# Patient Record
Sex: MALE | Race: WHITE | NOT HISPANIC OR LATINO | URBAN - NONMETROPOLITAN AREA
[De-identification: names, ages, dates, MRNs, and addresses within clinical notes are randomized per-mention and may not be internally consistent; named-entity substitution may affect disease eponyms.]

---

## 2017-02-02 ENCOUNTER — APPOINTMENT (OUTPATIENT)
Age: 62
Setting detail: DERMATOLOGY
End: 2017-02-02

## 2017-02-02 DIAGNOSIS — Z85.828 PERSONAL HISTORY OF OTHER MALIGNANT NEOPLASM OF SKIN: ICD-10-CM

## 2017-02-02 DIAGNOSIS — L23.7 ALLERGIC CONTACT DERMATITIS DUE TO PLANTS, EXCEPT FOOD: ICD-10-CM

## 2017-02-02 DIAGNOSIS — L57.0 ACTINIC KERATOSIS: ICD-10-CM

## 2017-02-02 PROCEDURE — OTHER TREATMENT REGIMEN: OTHER

## 2017-02-02 PROCEDURE — OTHER COUNSELING: OTHER

## 2017-02-02 PROCEDURE — OTHER PATIENT SPECIFIC COUNSELING: OTHER

## 2017-02-02 PROCEDURE — 99213 OFFICE O/P EST LOW 20 MIN: CPT

## 2017-02-02 ASSESSMENT — LOCATION DETAILED DESCRIPTION DERM
LOCATION DETAILED: LEFT CENTRAL MALAR CHEEK
LOCATION DETAILED: RIGHT MEDIAL TRAPEZIAL NECK

## 2017-02-02 ASSESSMENT — LOCATION ZONE DERM
LOCATION ZONE: FACE
LOCATION ZONE: NECK

## 2017-02-02 ASSESSMENT — LOCATION SIMPLE DESCRIPTION DERM
LOCATION SIMPLE: LEFT CHEEK
LOCATION SIMPLE: POSTERIOR NECK

## 2017-02-02 NOTE — HPI: SKIN LESION
How Severe Is Your Skin Lesion?: mild
Is This A New Presentation, Or A Follow-Up?: Skin Lesion
Additional History: Had been like a scab but feels like it is gone now.

## 2017-02-02 NOTE — HPI: NON-MELANOMA SKIN CANCER F/U (HISTORY OF NMSC)
What Is The Reason For Today's Visit?: History of Non-Melanoma Skin Cancer
How Many Skin Cancers Have You Had?: one
When Was Your Last Cancer Diagnosed?: April 2016

## 2017-02-02 NOTE — PROCEDURE: TREATMENT REGIMEN
Continue Regimen: Clobetasol cream twice daily to affected areas as needed
Detail Level: Simple
Otc Regimen: Lynette X appears to have replaced Lynette Block use as preventative barrier

## 2017-10-16 ENCOUNTER — APPOINTMENT (OUTPATIENT)
Age: 62
Setting detail: DERMATOLOGY
End: 2017-10-16

## 2017-10-16 DIAGNOSIS — L57.0 ACTINIC KERATOSIS: ICD-10-CM

## 2017-10-16 DIAGNOSIS — Z85.828 PERSONAL HISTORY OF OTHER MALIGNANT NEOPLASM OF SKIN: ICD-10-CM

## 2017-10-16 DIAGNOSIS — L28.1 PRURIGO NODULARIS: ICD-10-CM

## 2017-10-16 PROCEDURE — 17003 DESTRUCT PREMALG LES 2-14: CPT

## 2017-10-16 PROCEDURE — OTHER DEFER: OTHER

## 2017-10-16 PROCEDURE — 17000 DESTRUCT PREMALG LESION: CPT

## 2017-10-16 PROCEDURE — OTHER LIQUID NITROGEN: OTHER

## 2017-10-16 PROCEDURE — OTHER COUNSELING: OTHER

## 2017-10-16 PROCEDURE — OTHER PHOTODYNAMIC THERAPY COUNSELING: OTHER

## 2017-10-16 PROCEDURE — OTHER OTHER: OTHER

## 2017-10-16 PROCEDURE — 99213 OFFICE O/P EST LOW 20 MIN: CPT | Mod: 25

## 2017-10-16 PROCEDURE — OTHER EDUCATIONAL RESOURCES PROVIDED: OTHER

## 2017-10-16 ASSESSMENT — LOCATION ZONE DERM
LOCATION ZONE: ARM
LOCATION ZONE: NOSE
LOCATION ZONE: EAR
LOCATION ZONE: FACE
LOCATION ZONE: HAND

## 2017-10-16 ASSESSMENT — LOCATION DETAILED DESCRIPTION DERM
LOCATION DETAILED: LEFT LATERAL MALAR CHEEK
LOCATION DETAILED: LEFT FOREHEAD
LOCATION DETAILED: RIGHT PROXIMAL DORSAL FOREARM
LOCATION DETAILED: RIGHT INFERIOR TEMPLE
LOCATION DETAILED: LEFT RADIAL DORSAL HAND
LOCATION DETAILED: RIGHT SUPERIOR LATERAL BUCCAL CHEEK
LOCATION DETAILED: RIGHT FOREHEAD
LOCATION DETAILED: RIGHT SUPERIOR CRUS OF ANTIHELIX
LOCATION DETAILED: RIGHT LATERAL ELBOW
LOCATION DETAILED: SUPERIOR MID FOREHEAD
LOCATION DETAILED: RIGHT LATERAL MALAR CHEEK
LOCATION DETAILED: NASAL SUPRATIP
LOCATION DETAILED: LEFT SUPERIOR CRUS OF ANTIHELIX
LOCATION DETAILED: RIGHT DISTAL POSTERIOR UPPER ARM
LOCATION DETAILED: LEFT SUPERIOR LATERAL BUCCAL CHEEK
LOCATION DETAILED: LEFT DISTAL DORSAL FOREARM
LOCATION DETAILED: LEFT INFERIOR TEMPLE
LOCATION DETAILED: LEFT CENTRAL MALAR CHEEK
LOCATION DETAILED: RIGHT CENTRAL MALAR CHEEK

## 2017-10-16 ASSESSMENT — LOCATION SIMPLE DESCRIPTION DERM
LOCATION SIMPLE: SUPERIOR FOREHEAD
LOCATION SIMPLE: LEFT EAR
LOCATION SIMPLE: LEFT FOREARM
LOCATION SIMPLE: RIGHT EAR
LOCATION SIMPLE: RIGHT FOREHEAD
LOCATION SIMPLE: LEFT TEMPLE
LOCATION SIMPLE: LEFT HAND
LOCATION SIMPLE: RIGHT UPPER ARM
LOCATION SIMPLE: LEFT FOREHEAD
LOCATION SIMPLE: RIGHT FOREARM
LOCATION SIMPLE: LEFT CHEEK
LOCATION SIMPLE: RIGHT TEMPLE
LOCATION SIMPLE: RIGHT ELBOW
LOCATION SIMPLE: NOSE
LOCATION SIMPLE: RIGHT CHEEK

## 2017-10-16 NOTE — PROCEDURE: LIQUID NITROGEN
Post-Care Instructions: I reviewed with the patient in detail post-care instructions. Patient is to wear sunprotection. Contact Office If actinic keratoses fail to resolve despite treatment, or if you develop a side effect from therapy, such as unbearable crusting, scabbing, redness and tenderness. Return if you notice new similar scaling papules.
Consent: The patient's consent was obtained including but not limited to risks of crusting, scabbing, blistering, scarring, darker or lighter pigmentary change, recurrence, incomplete removal and infection.
Detail Level: Detailed
Total Number Of Aks Treated: 2
Duration Of Freeze Thaw-Cycle (Seconds): 0
Render Post-Care Instructions In Note?: no

## 2017-10-16 NOTE — PROCEDURE: OTHER
Other (Free Text): We discussed the lesions are more consistent with sun damage than poison ivy.
Detail Level: Detailed
Note Text (......Xxx Chief Complaint.): This diagnosis correlates with the
Other (Free Text): N2 freeze

## 2017-12-04 ENCOUNTER — APPOINTMENT (OUTPATIENT)
Age: 62
Setting detail: DERMATOLOGY
End: 2017-12-04

## 2017-12-04 DIAGNOSIS — L57.0 ACTINIC KERATOSIS: ICD-10-CM

## 2017-12-04 PROCEDURE — 96567 PDT DSTR PRMLG LES SKN: CPT

## 2017-12-04 PROCEDURE — OTHER PHOTODYNAMIC THERAPY COUNSELING: OTHER

## 2017-12-04 PROCEDURE — OTHER PHOTO-DOCUMENTATION: OTHER

## 2017-12-04 PROCEDURE — OTHER PDT: BLUE: OTHER

## 2017-12-04 PROCEDURE — OTHER EDUCATIONAL RESOURCES PROVIDED: OTHER

## 2017-12-04 PROCEDURE — OTHER OTHER: OTHER

## 2017-12-04 PROCEDURE — OTHER TREATMENT REGIMEN: OTHER

## 2017-12-04 ASSESSMENT — LOCATION DETAILED DESCRIPTION DERM
LOCATION DETAILED: LEFT CENTRAL MALAR CHEEK
LOCATION DETAILED: SUPERIOR MID FOREHEAD
LOCATION DETAILED: RIGHT CENTRAL MALAR CHEEK
LOCATION DETAILED: NASAL SUPRATIP

## 2017-12-04 ASSESSMENT — LOCATION SIMPLE DESCRIPTION DERM
LOCATION SIMPLE: SUPERIOR FOREHEAD
LOCATION SIMPLE: NOSE
LOCATION SIMPLE: LEFT CHEEK
LOCATION SIMPLE: RIGHT CHEEK

## 2017-12-04 ASSESSMENT — LOCATION ZONE DERM
LOCATION ZONE: FACE
LOCATION ZONE: NOSE

## 2017-12-04 NOTE — PROCEDURE: OTHER
Note Text (......Xxx Chief Complaint.): This diagnosis correlates with the
Detail Level: Detailed
Other (Free Text): PDT consent signed.\\nPre-PDT photos obtained.\\nAreas to be treated vigorously cleaned with Acetone mix and then Levulan applied.\\nPatient's eyes were covered with protective goggles or disposable adhesive eye shields. \\nPatient given a call button to be used if needed.\\nBlu-Light Started: 1045 with no interruptions.\\nPatient's response during treatment: burning and stinging.\\nPost-PDT photos obtained.\\nAfter treatment: Patient washed treated areas gently with a mild cleanser to remove any remaining Levulan solution, then areas washed were patted dry, and finally sunscreen applied.

## 2017-12-04 NOTE — PROCEDURE: PHOTO-DOCUMENTATION
Detail Level: Zone
Photo Preface (Leave Blank If You Do Not Want): Photographs were obtained today to document before and after light treatment.

## 2018-01-15 ENCOUNTER — APPOINTMENT (OUTPATIENT)
Age: 63
Setting detail: DERMATOLOGY
End: 2018-01-15

## 2018-01-15 DIAGNOSIS — L57.0 ACTINIC KERATOSIS: ICD-10-CM

## 2018-01-15 PROCEDURE — 17000 DESTRUCT PREMALG LESION: CPT

## 2018-01-15 PROCEDURE — OTHER COUNSELING: OTHER

## 2018-01-15 PROCEDURE — OTHER LIQUID NITROGEN: OTHER

## 2018-01-15 PROCEDURE — 17003 DESTRUCT PREMALG LES 2-14: CPT

## 2018-01-15 ASSESSMENT — LOCATION DETAILED DESCRIPTION DERM
LOCATION DETAILED: RIGHT SUPERIOR OCCIPITAL SCALP
LOCATION DETAILED: LEFT SUPERIOR OCCIPITAL SCALP
LOCATION DETAILED: LEFT CENTRAL MALAR CHEEK
LOCATION DETAILED: NASAL DORSUM
LOCATION DETAILED: MID-OCCIPITAL SCALP

## 2018-01-15 ASSESSMENT — LOCATION ZONE DERM
LOCATION ZONE: NOSE
LOCATION ZONE: SCALP
LOCATION ZONE: FACE

## 2018-01-15 ASSESSMENT — LOCATION SIMPLE DESCRIPTION DERM
LOCATION SIMPLE: POSTERIOR SCALP
LOCATION SIMPLE: LEFT CHEEK
LOCATION SIMPLE: NOSE

## 2018-01-15 NOTE — PROCEDURE: LIQUID NITROGEN
Total Number Of Aks Treated: 4
Post-Care Instructions: I reviewed with the patient in detail post-care instructions. Patient is to wear sunprotection. Contact Office If actinic keratoses fail to resolve despite treatment, or if you develop a side effect from therapy, such as unbearable crusting, scabbing, redness and tenderness. Return if you notice new similar scaling papules.
Render Post-Care Instructions In Note?: no
Duration Of Freeze Thaw-Cycle (Seconds): 0
Detail Level: Detailed
Consent: The patient's consent was obtained including but not limited to risks of crusting, scabbing, blistering, scarring, darker or lighter pigmentary change, recurrence, incomplete removal and infection.

## 2018-07-24 ENCOUNTER — APPOINTMENT (OUTPATIENT)
Age: 63
Setting detail: DERMATOLOGY
End: 2018-07-24

## 2018-07-24 DIAGNOSIS — L57.0 ACTINIC KERATOSIS: ICD-10-CM

## 2018-07-24 PROCEDURE — 17000 DESTRUCT PREMALG LESION: CPT

## 2018-07-24 PROCEDURE — 17003 DESTRUCT PREMALG LES 2-14: CPT

## 2018-07-24 PROCEDURE — OTHER COUNSELING: OTHER

## 2018-07-24 PROCEDURE — OTHER LIQUID NITROGEN: OTHER

## 2018-07-24 ASSESSMENT — LOCATION ZONE DERM
LOCATION ZONE: FACE
LOCATION ZONE: SCALP
LOCATION ZONE: ARM
LOCATION ZONE: HAND

## 2018-07-24 ASSESSMENT — LOCATION DETAILED DESCRIPTION DERM
LOCATION DETAILED: LEFT DISTAL DORSAL FOREARM
LOCATION DETAILED: RIGHT ULNAR DORSAL HAND
LOCATION DETAILED: RIGHT SUPERIOR FOREHEAD
LOCATION DETAILED: LEFT SUPERIOR PARIETAL SCALP
LOCATION DETAILED: RIGHT RADIAL DORSAL HAND
LOCATION DETAILED: LEFT SUPERIOR FOREHEAD

## 2018-07-24 ASSESSMENT — LOCATION SIMPLE DESCRIPTION DERM
LOCATION SIMPLE: LEFT FOREARM
LOCATION SIMPLE: RIGHT HAND
LOCATION SIMPLE: LEFT FOREHEAD
LOCATION SIMPLE: RIGHT FOREHEAD
LOCATION SIMPLE: SCALP

## 2018-07-24 NOTE — PROCEDURE: LIQUID NITROGEN
Render Post-Care Instructions In Note?: no
Total Number Of Aks Treated: 6
Detail Level: Detailed
Consent: The patient's consent was obtained including but not limited to risks of crusting, scabbing, blistering, scarring, darker or lighter pigmentary change, recurrence, incomplete removal and infection.
Post-Care Instructions: I reviewed with the patient in detail post-care instructions. Patient is to wear sunprotection. Contact Office If actinic keratoses fail to resolve despite treatment, or if you develop a side effect from therapy, such as unbearable crusting, scabbing, redness and tenderness. Return if you notice new similar scaling papules.
Duration Of Freeze Thaw-Cycle (Seconds): 0

## 2018-07-24 NOTE — PROCEDURE: COUNSELING
Detail Level: Zone
Patient Specific Counseling (Will Not Stick From Patient To Patient): PDT blue light may be useful to do again in the fall. Patient will reconsider this at next appointment.

## 2018-10-23 ENCOUNTER — APPOINTMENT (OUTPATIENT)
Age: 63
Setting detail: DERMATOLOGY
End: 2018-10-23

## 2018-10-23 DIAGNOSIS — Z85.828 PERSONAL HISTORY OF OTHER MALIGNANT NEOPLASM OF SKIN: ICD-10-CM

## 2018-10-23 DIAGNOSIS — L72.0 EPIDERMAL CYST: ICD-10-CM

## 2018-10-23 DIAGNOSIS — L82.1 OTHER SEBORRHEIC KERATOSIS: ICD-10-CM

## 2018-10-23 DIAGNOSIS — L57.0 ACTINIC KERATOSIS: ICD-10-CM

## 2018-10-23 PROCEDURE — OTHER PATIENT SPECIFIC COUNSELING: OTHER

## 2018-10-23 PROCEDURE — OTHER COUNSELING: OTHER

## 2018-10-23 PROCEDURE — OTHER OTHER: OTHER

## 2018-10-23 PROCEDURE — 99213 OFFICE O/P EST LOW 20 MIN: CPT

## 2018-10-23 PROCEDURE — OTHER DEFER: OTHER

## 2018-10-23 ASSESSMENT — LOCATION ZONE DERM
LOCATION ZONE: NOSE
LOCATION ZONE: FACE
LOCATION ZONE: TRUNK

## 2018-10-23 ASSESSMENT — LOCATION DETAILED DESCRIPTION DERM
LOCATION DETAILED: NASAL INFRATIP
LOCATION DETAILED: SUPERIOR MID FOREHEAD
LOCATION DETAILED: RIGHT SUPERIOR MEDIAL UPPER BACK
LOCATION DETAILED: LEFT CENTRAL MALAR CHEEK
LOCATION DETAILED: RIGHT CENTRAL MALAR CHEEK

## 2018-10-23 ASSESSMENT — LOCATION SIMPLE DESCRIPTION DERM
LOCATION SIMPLE: RIGHT CHEEK
LOCATION SIMPLE: RIGHT UPPER BACK
LOCATION SIMPLE: LEFT CHEEK
LOCATION SIMPLE: NOSE
LOCATION SIMPLE: SUPERIOR FOREHEAD

## 2018-10-23 NOTE — PROCEDURE: PATIENT SPECIFIC COUNSELING
Discussed treating with PDT vs treating topically, but the patient is on oral treatment for leukemia and his treatment may reduce the effect of Aldara.
Detail Level: Detailed

## 2018-10-23 NOTE — PROCEDURE: OTHER
Note Text (......Xxx Chief Complaint.): This diagnosis correlates with the
Detail Level: Detailed
Other (Free Text): Milium extracted. Patient reassured that this is a benign lesion.

## 2018-11-29 ENCOUNTER — APPOINTMENT (OUTPATIENT)
Age: 63
Setting detail: DERMATOLOGY
End: 2018-11-29

## 2018-11-29 DIAGNOSIS — L57.0 ACTINIC KERATOSIS: ICD-10-CM

## 2018-11-29 PROCEDURE — OTHER PDT: BLUE: OTHER

## 2018-11-29 PROCEDURE — 96574 DBRDMT PRMLG LES W/PDT: CPT

## 2018-11-29 ASSESSMENT — LOCATION SIMPLE DESCRIPTION DERM: LOCATION SIMPLE: INFERIOR FOREHEAD

## 2018-11-29 ASSESSMENT — LOCATION DETAILED DESCRIPTION DERM: LOCATION DETAILED: INFERIOR MID FOREHEAD

## 2018-11-29 ASSESSMENT — LOCATION ZONE DERM: LOCATION ZONE: FACE

## 2018-11-29 NOTE — PROCEDURE: PDT: BLUE
Debridement Text (Will Only Render In Visit Note If You Select Debridement Option Under Who Performed The Pdt Field): Prior to application of the photodynamic medication the hyperkeratotic lesions were curetted with sterile skin adhesive tape to make them more amenable to therapy.

## 2018-11-29 NOTE — PROCEDURE: PDT: BLUE
Who Performed The Pdt?: Performed by MD ANDRES, ILA or ISAURA with Pre-Procedure Debridement of Hyperkeratotic Lesions (26356) Who Performed The Pdt?: Performed by MD ANDRES, ILA or ISAURA with Pre-Procedure Debridement of Hyperkeratotic Lesions ([id number])

## 2019-01-03 ENCOUNTER — APPOINTMENT (OUTPATIENT)
Dept: URBAN - METROPOLITAN AREA CLINIC 237 | Age: 64
Setting detail: DERMATOLOGY
End: 2019-01-03

## 2019-01-03 DIAGNOSIS — L57.8 OTHER SKIN CHANGES DUE TO CHRONIC EXPOSURE TO NONIONIZING RADIATION: ICD-10-CM

## 2019-01-03 DIAGNOSIS — L57.0 ACTINIC KERATOSIS: ICD-10-CM

## 2019-01-03 PROCEDURE — 17003 DESTRUCT PREMALG LES 2-14: CPT

## 2019-01-03 PROCEDURE — 17000 DESTRUCT PREMALG LESION: CPT

## 2019-01-03 PROCEDURE — OTHER LIQUID NITROGEN: OTHER

## 2019-01-03 PROCEDURE — 99213 OFFICE O/P EST LOW 20 MIN: CPT | Mod: 25

## 2019-01-03 PROCEDURE — OTHER COUNSELING: OTHER

## 2019-01-03 ASSESSMENT — LOCATION SIMPLE DESCRIPTION DERM
LOCATION SIMPLE: RIGHT EAR
LOCATION SIMPLE: RIGHT TEMPLE
LOCATION SIMPLE: LEFT FOREARM
LOCATION SIMPLE: LEFT FOREHEAD
LOCATION SIMPLE: RIGHT FOREHEAD
LOCATION SIMPLE: LEFT EAR
LOCATION SIMPLE: RIGHT CHEEK
LOCATION SIMPLE: LEFT WRIST
LOCATION SIMPLE: LEFT CHEEK

## 2019-01-03 ASSESSMENT — LOCATION DETAILED DESCRIPTION DERM
LOCATION DETAILED: RIGHT FOREHEAD
LOCATION DETAILED: LEFT SUPERIOR LATERAL BUCCAL CHEEK
LOCATION DETAILED: RIGHT LATERAL FOREHEAD
LOCATION DETAILED: LEFT DISTAL RADIAL DORSAL FOREARM
LOCATION DETAILED: RIGHT SUPERIOR LATERAL BUCCAL CHEEK
LOCATION DETAILED: LEFT DISTAL DORSAL FOREARM
LOCATION DETAILED: LEFT ANTITRAGUS
LOCATION DETAILED: RIGHT INCISURA INTERTRAGICA
LOCATION DETAILED: LEFT DORSAL WRIST
LOCATION DETAILED: LEFT FOREHEAD
LOCATION DETAILED: LEFT MEDIAL FOREHEAD
LOCATION DETAILED: RIGHT INFERIOR MEDIAL BUCCAL CHEEK
LOCATION DETAILED: RIGHT MID TEMPLE
LOCATION DETAILED: RIGHT INFERIOR CENTRAL MALAR CHEEK
LOCATION DETAILED: LEFT INFERIOR CENTRAL MALAR CHEEK
LOCATION DETAILED: LEFT CENTRAL MALAR CHEEK
LOCATION DETAILED: RIGHT INFERIOR LATERAL FOREHEAD

## 2019-01-03 ASSESSMENT — LOCATION ZONE DERM
LOCATION ZONE: EAR
LOCATION ZONE: ARM
LOCATION ZONE: FACE

## 2019-01-03 NOTE — PROCEDURE: LIQUID NITROGEN
Render Post-Care Instructions In Note?: no
Consent: The patient's consent was obtained including but not limited to risks of crusting, scabbing, blistering, scarring, darker or lighter pigmentary change, recurrence, incomplete removal and infection.
Number Of Freeze-Thaw Cycles: 3 freeze-thaw cycles
Duration Of Freeze Thaw-Cycle (Seconds): 3
Post-Care Instructions: I reviewed with the patient in detail post-care instructions. Patient is to wear sunprotection, and avoid picking at any of the treated lesions. Pt may apply Vaseline to crusted or scabbing areas.
Detail Level: Detailed

## 2019-01-03 NOTE — HPI: FREE FORM (FOLLOW UP HISTORY)
How Severe Is Your Skin Condition?: moderate
What Brings You In Today For Follow Up? (This Is An Xx Year Old Patient Who Is Following Up For:): Pdt follow up on 11/29/18
Where On Your Body Is It? (Located On:): Face and ears
What Was It Treated With? (The Condition Was Treated With:): Pdt constantino light
Since The Treatment Is Your Condition Better, Worse, Or Unchanged? (Since The Treatment, The Condition Is:): Patient states it didn’t burn, peel, or blister this time.

## 2019-04-02 ENCOUNTER — APPOINTMENT (OUTPATIENT)
Age: 64
Setting detail: DERMATOLOGY
End: 2019-04-02

## 2019-04-02 DIAGNOSIS — L57.0 ACTINIC KERATOSIS: ICD-10-CM

## 2019-04-02 PROCEDURE — OTHER PDT: BLUE: OTHER

## 2019-04-02 PROCEDURE — 96567 PDT DSTR PRMLG LES SKN: CPT

## 2019-04-02 ASSESSMENT — LOCATION SIMPLE DESCRIPTION DERM: LOCATION SIMPLE: INFERIOR FOREHEAD

## 2019-04-02 ASSESSMENT — LOCATION ZONE DERM: LOCATION ZONE: FACE

## 2019-04-02 ASSESSMENT — LOCATION DETAILED DESCRIPTION DERM: LOCATION DETAILED: INFERIOR MID FOREHEAD

## 2019-05-07 ENCOUNTER — APPOINTMENT (OUTPATIENT)
Age: 64
Setting detail: DERMATOLOGY
End: 2019-05-07

## 2019-05-07 DIAGNOSIS — L57.0 ACTINIC KERATOSIS: ICD-10-CM

## 2019-05-07 PROCEDURE — OTHER COUNSELING: OTHER

## 2019-05-07 PROCEDURE — OTHER PDT FOLLOW-UP EVALUATION: OTHER

## 2019-05-07 PROCEDURE — 99212 OFFICE O/P EST SF 10 MIN: CPT | Mod: 25

## 2019-05-07 PROCEDURE — 17004 DESTROY PREMAL LESIONS 15/>: CPT

## 2019-05-07 PROCEDURE — OTHER LIQUID NITROGEN: OTHER

## 2019-05-07 ASSESSMENT — LOCATION SIMPLE DESCRIPTION DERM
LOCATION SIMPLE: LEFT OCCIPITAL SCALP
LOCATION SIMPLE: LEFT CHEEK
LOCATION SIMPLE: RIGHT FOREARM
LOCATION SIMPLE: RIGHT CHEEK
LOCATION SIMPLE: LEFT ELBOW
LOCATION SIMPLE: LEFT UPPER ARM
LOCATION SIMPLE: SUPERIOR FOREHEAD
LOCATION SIMPLE: GLABELLA
LOCATION SIMPLE: LEFT FOREARM
LOCATION SIMPLE: SCALP

## 2019-05-07 ASSESSMENT — LOCATION ZONE DERM
LOCATION ZONE: ARM
LOCATION ZONE: FACE
LOCATION ZONE: SCALP

## 2019-05-07 ASSESSMENT — LOCATION DETAILED DESCRIPTION DERM
LOCATION DETAILED: RIGHT CENTRAL MALAR CHEEK
LOCATION DETAILED: LEFT DISTAL DORSAL FOREARM
LOCATION DETAILED: GLABELLA
LOCATION DETAILED: LEFT SUPERIOR OCCIPITAL SCALP
LOCATION DETAILED: LEFT PROXIMAL DORSAL FOREARM
LOCATION DETAILED: LEFT SUPERIOR PARIETAL SCALP
LOCATION DETAILED: RIGHT PROXIMAL ULNAR DORSAL FOREARM
LOCATION DETAILED: LEFT ELBOW
LOCATION DETAILED: LEFT DISTAL POSTERIOR UPPER ARM
LOCATION DETAILED: LEFT CENTRAL MALAR CHEEK
LOCATION DETAILED: SUPERIOR MID FOREHEAD
LOCATION DETAILED: LEFT CENTRAL PARIETAL SCALP
LOCATION DETAILED: RIGHT DISTAL DORSAL FOREARM

## 2019-05-07 NOTE — PROCEDURE: PDT FOLLOW-UP EVALUATION
Partial Response - Prescribe Picato Text: The patient has had a partial response to photodynamic therapy.  The remaining precancerous lesions will be treated with topical field therapy using Picato gel.
Partial Response - Prescribe Aldara Text: The patient has had a partial response to photodynamic therapy.  The remaining precancerous lesions will be treated with topical field therapy using imiquimod cream.
Inadequate Response - Prescribe Picato Text: The patient has had an inadequate response to photodynamic therapy.  The remaining precancerous lesions will be treated with topical field therapy using Picato gel.
Time Since Last Pdt?: 1 month
Number Of Sequential Treatments Performed?: 1
Partial Response - Schedule Further Pdt Text: The patient has had a partial response to photodynamic therapy.  The remaining precancerous lesions will be treated by further photodynamic therapy.
Response To Pdt: Complete Response
Inadequate Response - Prescribe Efudex Text: The patient has had an inadequate response to photodynamic therapy.  The remaining precancerous lesions will be treated with topical field therapy using 5-fluorouracil cream.
Complete Response Text: The patient has had a complete resolution of their actinic keratoses with photodynamic therapy.
Partial Response - Prescribe Zyclara Text: The patient has had a partial response to photodynamic therapy.  The remaining precancerous lesions will be treated with topical field therapy using Zyclara cream.
Partial Response - Cryotherapy Performed Today Text: The patient has had a partial response to photodynamic therapy.  The remaining precancerous lesions will be treated with cryotherapy today.
Detail Level: Zone
Inadequate Response - Prescribe Aldara Text: The patient has had an inadequate response to photodynamic therapy.  The remaining precancerous lesions will be treated with topical field therapy using imiquimod cream.
Partial Response - Prescribe Efudex Text: The patient has had a partial response to photodynamic therapy.  The remaining precancerous lesions will be treated with topical field therapy using 5-fluorouracil cream.
Inadequate Response - Cryotherapy Performed Today Text: The patient has had an inadequate response to photodynamic therapy.  The remaining precancerous lesions will be treated with cryotherapy today.
Inadequate Response - Schedule Further Pdt Text: The patient has had an inadequate response to photodynamic therapy.  The remaining precancerous lesions will be treated by further photodynamic therapy.
Inadequate Response - Prescribe Zyclara Text: The patient has had an inadequate response to photodynamic therapy.  The remaining precancerous lesions will be treated with topical field therapy using Zyclara cream.

## 2019-05-07 NOTE — PROCEDURE: LIQUID NITROGEN
Consent: The patient's consent was obtained including but not limited to risks of crusting, scabbing, blistering, scarring, darker or lighter pigmentary change, recurrence, incomplete removal and infection.
Detail Level: Detailed
Number Of Freeze-Thaw Cycles: 1 freeze-thaw cycle
Post-Care Instructions: I reviewed with the patient in detail post-care instructions. Patient is to wear sunprotection, and avoid picking at any of the treated lesions. Pt may apply Vaseline to crusted or scabbing areas.
Render Post-Care Instructions In Note?: no
Duration Of Freeze Thaw-Cycle (Seconds): 10